# Patient Record
Sex: MALE | Race: WHITE | Employment: FULL TIME | URBAN - METROPOLITAN AREA
[De-identification: names, ages, dates, MRNs, and addresses within clinical notes are randomized per-mention and may not be internally consistent; named-entity substitution may affect disease eponyms.]

---

## 2019-09-25 ENCOUNTER — HOSPITAL ENCOUNTER (EMERGENCY)
Facility: CLINIC | Age: 39
Discharge: HOME | End: 2019-09-25
Attending: PHYSICIAN ASSISTANT | Admitting: PHYSICIAN ASSISTANT
Payer: OTHER MISCELLANEOUS

## 2019-09-25 ENCOUNTER — APPOINTMENT (OUTPATIENT)
Dept: GENERAL RADIOLOGY | Facility: CLINIC | Age: 39
End: 2019-09-25
Attending: EMERGENCY MEDICINE
Payer: OTHER MISCELLANEOUS

## 2019-09-25 VITALS
OXYGEN SATURATION: 99 % | TEMPERATURE: 98.8 F | RESPIRATION RATE: 16 BRPM | HEART RATE: 107 BPM | SYSTOLIC BLOOD PRESSURE: 153 MMHG | DIASTOLIC BLOOD PRESSURE: 99 MMHG

## 2019-09-25 DIAGNOSIS — S91.311A LACERATION OF FOOT, RIGHT, INITIAL ENCOUNTER: ICD-10-CM

## 2019-09-25 PROCEDURE — 12001 RPR S/N/AX/GEN/TRNK 2.5CM/<: CPT

## 2019-09-25 PROCEDURE — 99283 EMERGENCY DEPT VISIT LOW MDM: CPT

## 2019-09-25 PROCEDURE — 73630 X-RAY EXAM OF FOOT: CPT | Mod: RT

## 2019-09-25 ASSESSMENT — ENCOUNTER SYMPTOMS
WOUND: 1
NUMBNESS: 0

## 2019-09-25 NOTE — LETTER
September 25, 2019      To Whom It May Concern:      Donald Milian was seen in our Emergency Department today, 09/25/19. He may return to work (light duty) immediately.    Sincerely,        Keven Gamble PA-C

## 2019-09-25 NOTE — ED AVS SNAPSHOT
[facility]  [address]  [facility] [address]  Phone:  [phone]  Fax:  [phone]                                    Donald Milian   MRN: [id number]    Department:  [facility]   Date of Visit:  9/25/2019           After Visit Summary Signature Page    I have received my discharge instructions, and my questions have been answered. I have discussed any challenges I see with this plan with the nurse or doctor.    ..........................................................................................................................................  Patient/Patient Representative Signature      ..........................................................................................................................................  Patient Representative Print Name and Relationship to Patient    ..................................................               ................................................  Date                                   Time    ..........................................................................................................................................  Reviewed by Signature/Title    ...................................................              ..............................................  Date                                               Time          22EPIC Rev 08/18

## 2019-09-25 NOTE — ED TRIAGE NOTES
Pt with right foot laceration from chain saw injury at work, bleeding controlled. Up to date on tetanus. ABC's intact, alert and oriented X3.

## 2019-09-25 NOTE — ED PROVIDER NOTES
History     Chief Complaint:  Laceration    HPI   Donald Milian is a 38 year old male who presents with laceration on right foot. The patient states at 1100 he was working with his friend when his friend's chain saw hit a piece of metal and jumped hitting the lateral aspect of his left foot cutting through the boot. The patient rinsed the wound immediately with water. He states that 2 pieces of wood washed out. The patient states he has been able to walk on his foot. The patient states his tetanus was updated 2 years ago.     Allergies:  No Known Allergies     Medications:    Adderall    Past Medical History:    ADHD  Coaractation of aorta    Past Surgical History:    Hernia repair  Ventra septal defect repair    Family History:   History reviewed. No pertinent family history.    Social History:  Work related injury.     Review of Systems   Skin: Positive for wound.   All other systems reviewed and are negative.    Physical Exam     Patient Vitals for the past 24 hrs:   BP Temp Temp src Pulse Heart Rate Resp SpO2   09/25/19 1149 (!) 153/99 98.8  F (37.1  C) Temporal 107 107 16 99 %     Physical Exam  General: Alert and interactive. Appears well.   Head: Atraumatic, without obvious lesion, abrasion, hematoma.   Eyes: The pupils are equal and round. No scleral icterus.   ENT: No obvious abnormalities to the ears or nose. Mucous membranes moist.   Neck:Trachea is in the midline. No obvious swelling to the neck. Full range of motion.   CV: Regular rate. Extremities well perfused. Capillary refill brisk fingers.   Resp: Non-labored, no retractions or accessory muscle use.     GI: Abdomen is not distended.   MS: Moving all extremities well. Patient able to wiggle toes, dorsiflex and plantar flex against resistance. No tendon involvement in wound.   Skin: 2.5 cm horizontal laceration overlying the distal aspect of the 5th metatarsal on the dorsal aspect of the right foot   Neuro: Alert and oriented x 3. Non-focal  examination.    Psych: Awake. Alert.  Normal affect. Appropriate interactions.    Emergency Department Course   Imaging:  Radiology findings were communicated with the patient who voiced understanding of the findings.    XR Foot Right   No evidence of fracture or foreign body.   Reading per radiology     Emergency Department Course:  Nursing notes and vitals reviewed.    The patient was sent for a XR Foot Right while in the emergency department, results above.     1225 I performed an exam of the patient as documented above.     1320 I returned to update the patient and examine the stiches.     Findings and plan explained to the Patient. Patient discharged home with instructions regarding supportive care, medications, and reasons to return. The importance of close follow-up was reviewed.     Impression & Plan    Medical Decision Making:  Donald Milian is a 38 year old male who presents with a laceration to the right foot. The wound was carefully evaluated and explored, and there is no evidence at this time of bony injury, foreign body, deep space infection, or neurovascular injury. The laceration was repaired by Keven Gamble, please see his note for further details. I was able to visualize the sutures after placement and the procedure was excellently done. Follow up in 2-3 days if concerns over healing. I dicussed the risk of infection and scarring.The patient is to follow-up for suture removal in 7-10 days. Indications for immediate return to ER/UR were reviewed and included, but are not limited to, redness, fevers, drainage, increasing pain, high fevers, or other concerns.     Diagnosis:    ICD-10-CM    1. Laceration of foot, right, initial encounter S91.311A        Disposition:   The patient is discharged to home.    Discharge Medications:  No discharge medication.     Scribe Disclosure:  Jackie CASTELLANO, am serving as a scribe at 12:17 PM on 9/25/2019 to document services personally performed by Radha Horowitz  BERNADINE Evans based on my observations and the provider's statements to me.   [facility] EMERGENCY DEPARTMENT       Radha Horowitz PA-C  09/25/19 2971

## (undated) RX ORDER — BUPIVACAINE HYDROCHLORIDE 5 MG/ML
INJECTION, SOLUTION PERINEURAL
Status: DISPENSED
Start: 2019-09-25